# Patient Record
Sex: MALE | Race: WHITE
[De-identification: names, ages, dates, MRNs, and addresses within clinical notes are randomized per-mention and may not be internally consistent; named-entity substitution may affect disease eponyms.]

---

## 2019-12-15 ENCOUNTER — HOSPITAL ENCOUNTER (EMERGENCY)
Dept: HOSPITAL 25 - ED | Age: 19
Discharge: HOME | End: 2019-12-15
Payer: COMMERCIAL

## 2019-12-15 VITALS — SYSTOLIC BLOOD PRESSURE: 149 MMHG | DIASTOLIC BLOOD PRESSURE: 99 MMHG

## 2019-12-15 DIAGNOSIS — E86.0: ICD-10-CM

## 2019-12-15 DIAGNOSIS — J02.9: Primary | ICD-10-CM

## 2019-12-15 DIAGNOSIS — Z88.2: ICD-10-CM

## 2019-12-15 LAB
ALBUMIN SERPL BCG-MCNC: 4.9 G/DL (ref 3.2–5.2)
ALBUMIN/GLOB SERPL: 1.6 {RATIO} (ref 1–3)
ALP SERPL-CCNC: 93 U/L (ref 34–104)
ALT SERPL W P-5'-P-CCNC: 19 U/L (ref 7–52)
ANION GAP SERPL CALC-SCNC: 12 MMOL/L (ref 2–11)
AST SERPL-CCNC: 18 U/L (ref 13–39)
BASOPHILS # BLD AUTO: 0 10^3/UL (ref 0–0.2)
BUN SERPL-MCNC: 6 MG/DL (ref 6–24)
BUN/CREAT SERPL: 6.2 (ref 8–20)
CALCIUM SERPL-MCNC: 10.6 MG/DL (ref 8.6–10.3)
CHLORIDE SERPL-SCNC: 103 MMOL/L (ref 101–111)
EOSINOPHIL # BLD AUTO: 0.2 10^3/UL (ref 0–0.6)
GLOBULIN SER CALC-MCNC: 3 G/DL (ref 2–4)
GLUCOSE SERPL-MCNC: 91 MG/DL (ref 70–100)
HCO3 SERPL-SCNC: 24 MMOL/L (ref 22–32)
HCT VFR BLD AUTO: 49 % (ref 42–52)
HGB BLD-MCNC: 16.9 G/DL (ref 14–18)
LYMPHOCYTES # BLD AUTO: 1.2 10^3/UL (ref 1–4.8)
MCH RBC QN AUTO: 32 PG (ref 27–31)
MCHC RBC AUTO-ENTMCNC: 35 G/DL (ref 31–36)
MCV RBC AUTO: 92 FL (ref 80–94)
MONOCYTES # BLD AUTO: 0.8 10^3/UL (ref 0–0.8)
NEUTROPHILS # BLD AUTO: 6.9 10^3/UL (ref 1.5–7.7)
NRBC # BLD AUTO: 0 10^3/UL
NRBC BLD QL AUTO: 0.2
PLATELET # BLD AUTO: 306 10^3/UL (ref 150–450)
POTASSIUM SERPL-SCNC: 3.6 MMOL/L (ref 3.5–5)
PROT SERPL-MCNC: 7.9 G/DL (ref 6.4–8.9)
RBC # BLD AUTO: 5.29 10^6 /UL (ref 4.18–5.48)
SODIUM SERPL-SCNC: 139 MMOL/L (ref 135–145)
WBC # BLD AUTO: 9.1 10^3/UL (ref 3.5–10.8)

## 2019-12-15 PROCEDURE — 87651 STREP A DNA AMP PROBE: CPT

## 2019-12-15 PROCEDURE — 85025 COMPLETE CBC W/AUTO DIFF WBC: CPT

## 2019-12-15 PROCEDURE — 99282 EMERGENCY DEPT VISIT SF MDM: CPT

## 2019-12-15 PROCEDURE — 80053 COMPREHEN METABOLIC PANEL: CPT

## 2019-12-15 PROCEDURE — 36415 COLL VENOUS BLD VENIPUNCTURE: CPT

## 2019-12-15 PROCEDURE — 96360 HYDRATION IV INFUSION INIT: CPT

## 2019-12-15 NOTE — ED
Complex/Multi-Sys Presentation





- HPI Summary


HPI Summary: 


Patient is a 18 y/o M presenting to [facility] with complaints of jaundice and sore 

throat. He also claims that he has been awake for the past 79 hours studying 

for finals. Patient reports using caffeine and his ADHD medication to stay 

awake. He notes that he was diagnosed with strep throat a few weeks ago around 

[facility]. Patient was prescribed a medication that he does not recall the 

name of to treat his strep throat. He states that he had return of sore throat 

three days ago. Sore throat is currently less severe compared to onset. Patient 

also believes that he appears jaundiced. He notes FMHx of Gilbert syndrome but 

denies past personal episodes of jaundice. On RN assessment, nothing is noted 

to aggravate/alleviate Sx. Home medications and allergies are reviewed. 








- History Of Current Complaint


Chief Complaint: EDGeneral


Time Seen by Provider: 12/15/19 18:48


Hx Obtained From: Patient


Onset/Duration: Lasting Days, Still Present


Timing: Constant, Days


Location: Pain At: - throat


Aggravating Factor(s): nothing


Alleviating Factor(s): nothing


Associated Signs And Symptoms: Positive: Other - positive - sore throat, 

jaundice, sleep deprivation





- Allergies/Home Medications


Allergies/Adverse Reactions: 


 Allergies











Allergy/AdvReac Type Severity Reaction Status Date / Time


 


Sulfa (Sulfonamide Allergy  Rash And Verified 12/15/19 18:17





Antibiotics)   Itching  











Home Medications: 


 Home Medications





Amphetamine MIXED SALT TAB* [Adderall TAB*] 20 mg PO BID 12/15/19 [History 

Confirmed 12/15/19]


DOXYcycline CAP(*) [DOXYcycline 100MG CAP(*)] 100 mg PO DAILY 12/15/19 [History 

Confirmed 12/15/19]


Ibuprofen TAB* [Advil TAB*] 400 mg PO Q6H PRN 12/15/19 [History Confirmed 12/15/

19]











PMH/Surg Hx/FS Hx/Imm Hx


Sensory History: 


   Denies: Hx Legally Blind, Hx Deafness


Opthamlomology History: 


   Denies: Hx Legally Blind


EENT History: 


   Denies: Hx Deafness


Infectious Disease History: No


Infectious Disease History: 


   Denies: Traveled Outside the [place] in Last 30 Days





- Family History


Known Family History: Positive: Other - Gilbert syndrome 





- Social History


Alcohol Use: Rare


Substance Use Type: Reports: None


Smoking Status (MU): Never Smoked Tobacco





Review of Systems


Constitutional: Other - positive - sleep deprivation 


Positive: Sore Throat


Skin: Other - positive - jaundice 


All Other Systems Reviewed And Are Negative: Yes





Physical Exam





- Summary


Physical Exam Summary: 


Appearance: The patient is well-nourished in no acute distress and in no acute 

pain.


 


Skin: The skin is warm and dry, and skin color reflects adequate perfusion.





HEENT: The head is normocephalic and atraumatic. The pupils are equal and 

reactive. The conjunctivae are without drainage. The sclera are somewhat 

icteric Nares are patent and without drainage. Mouth reveals moist mucous 

membranes, and the throat is with erythema but not exudate. The external ears 

are intact. The ear canals are patent and without drainage. The tympanic 

membranes are intact.


 


Neck: The neck is supple with full range of motion and non-tender. There are no 

carotid bruits. There is no neck vein distension.


 


Respiratory: Chest is non-tender. Lungs are clear to auscultation and breath 

sounds are symmetrical and equal.


 


Cardiovascular: Heart is regular rate and rhythm. There is no murmur or rub 

auscultated. There is no peripheral edema and pulses are symmetrical and equal.


 


Abdomen: The abdomen is soft and non-tender. There are normal bowel sounds 

heard in all four quadrants and there is no organomegaly palpated.


 


Musculoskeletal: There is no back tenderness noted. Extremities are non-tender 

with full range of motion. There is good capillary refill. There is no 

peripheral edema or calf tenderness elicited.


 


Neurological: Patient is alert and oriented to person, place and time. The 

patient has symmetrical motor strength in all four extremities. Cranial nerves 

are grossly intact. Deep tendon reflexes are symmetrical and equal in all four 

extremities.


 


Psychiatric: The patient has an appropriate affect and does not exhibit any 

anxiety or depression.








Triage Information Reviewed: Yes


Vital Signs On Initial Exam: 


 Initial Vitals











Temp Pulse Resp BP Pulse Ox


 


 98.0 F   98   18   156/104   100 


 


 12/15/19 18:13  12/15/19 18:13  12/15/19 18:13  12/15/19 18:13  12/15/19 18:13











Vital Signs Reviewed: Yes





Procedures





- Sedation


Patient Received Moderate/Deep Sedation with Procedure: No





Diagnostics





- Vital Signs


 Vital Signs











  Temp Pulse Resp BP Pulse Ox


 


 12/15/19 18:13  98.0 F  98  18  156/104  100














- Laboratory


Result Diagrams: 


 12/15/19 19:18





 12/15/19 19:18


Lab Statement: Any lab studies that have been ordered have been reviewed, and 

results considered in the medical decision making process.





Re-Evaluation





- Re-Evaluation


  ** First Eval


Re-Evaluation Time: 20:16


Comment: Results of workup were discussed, patient discharged to home and will 

follow up with PCP in three days.





Complex Multi-Symp Course/Dx


Course Of Treatment: Mr. Cristopher goncalves stayed up for about 80 hours preparing for 

end of the semester tasks.  He thinks he looks jaundiced today and has a sore 

throat.  His sister has been diagnosed with Gilbert's syndrome.  He was treated 

for strep a couple weeks ago.  He was nontoxic in appearance is stable vitals.  

His posterior pharynx was erythematous and I thought his sclera might be 

slightly icteric.  Rapid strep test was negative.  He was hydrated with IV 

normal saline as he is likely dehydrated from the last 8 hours where he has 

been using sympathomimetics.  His bilirubin was 1.9 and normal in this lab is 

up to 1.  I'm not sure of the significance of this but I recommended he follow-

up with his PCP and get some sleep.





- Diagnoses


Provider Diagnoses: 


 Pharyngitis, Dehydration








Discharge ED





- Sign-Out/Discharge


Documenting (check all that apply): Patient Departure - discharge 





- Discharge Plan


Condition: Stable


Disposition: HOME


Patient Education Materials:  Dehydration (ED), Pharyngitis (ED)


Referrals: 


[facility] - Raul AUGUSTIN [Primary Care Provider] - 3 Days


Additional Instructions: 


PLEASE RETURN TO ED FOR ANY NEW OR CONCERNING SYMPTOMS. PLEASE FOLLOW UP WITH 

YOUR PRIMARY CARE PHYSICIAN WITHIN THREE DAYS.





- Billing Disposition and Condition


Condition: STABLE


Disposition: Home





- Attestation Statements


Document Initiated by Beth: Yes


Documenting Scribe: TRINITY CRUZ


Provider For Whom Beth is Documenting (Include Credential): FERCHO ROY MD


Scribe Attestation: 


ITRINITY, scribed for FERCHO ROY MD on 12/15/19 at 2027. 


Scribe Documentation Reviewed: Yes


Provider Attestation: 


The documentation as recorded by the TRINITY baker accurately reflects 

the service I personally performed and the decisions made by me, FERCHO ROY MD


Status of Scribe Document: Viewed